# Patient Record
Sex: MALE | Race: WHITE | NOT HISPANIC OR LATINO | Employment: FULL TIME | URBAN - METROPOLITAN AREA
[De-identification: names, ages, dates, MRNs, and addresses within clinical notes are randomized per-mention and may not be internally consistent; named-entity substitution may affect disease eponyms.]

---

## 2022-05-13 ENCOUNTER — HOSPITAL ENCOUNTER (EMERGENCY)
Facility: HOSPITAL | Age: 32
Discharge: HOME | End: 2022-05-13
Admitting: PHYSICIAN ASSISTANT
Payer: COMMERCIAL

## 2022-05-13 VITALS
BODY MASS INDEX: 20.76 KG/M2 | HEIGHT: 70 IN | OXYGEN SATURATION: 98 % | RESPIRATION RATE: 18 BRPM | HEART RATE: 90 BPM | TEMPERATURE: 99 F | DIASTOLIC BLOOD PRESSURE: 78 MMHG | WEIGHT: 145 LBS | SYSTOLIC BLOOD PRESSURE: 141 MMHG

## 2022-05-13 DIAGNOSIS — S01.01XA LACERATION OF SCALP WITHOUT FOREIGN BODY, INITIAL ENCOUNTER: ICD-10-CM

## 2022-05-13 PROCEDURE — 250N000011 HC RX IP 250 OP 636: Performed by: STUDENT IN AN ORGANIZED HEALTH CARE EDUCATION/TRAINING PROGRAM

## 2022-05-13 PROCEDURE — 271N000002 HC RX 271: Performed by: STUDENT IN AN ORGANIZED HEALTH CARE EDUCATION/TRAINING PROGRAM

## 2022-05-13 PROCEDURE — 12001 RPR S/N/AX/GEN/TRNK 2.5CM/<: CPT

## 2022-05-13 PROCEDURE — 250N000009 HC RX 250: Performed by: STUDENT IN AN ORGANIZED HEALTH CARE EDUCATION/TRAINING PROGRAM

## 2022-05-13 PROCEDURE — 99283 EMERGENCY DEPT VISIT LOW MDM: CPT

## 2022-05-13 RX ORDER — METHYLCELLULOSE 4000CPS 30 %
POWDER (GRAM) MISCELLANEOUS
Status: COMPLETED | OUTPATIENT
Start: 2022-05-13 | End: 2022-05-13

## 2022-05-13 RX ADMIN — EPINEPHRINE BITARTRATE 3 ML: 1 POWDER at 22:05

## 2022-05-13 RX ADMIN — Medication 150 MG: at 22:05

## 2022-05-14 NOTE — ED PROVIDER NOTES
ED PROVIDER NOTE    EMERGENCY DEPARTMENT ENCOUNTER      NAME: Hsusein Malone  AGE: 31 year old male  YOB: 1990  MRN: [id number]  EVALUATION DATE & TIME: 5/13/2022 11:18 PM    PCP: No Ref-Primary, Physician    ED PROVIDER: Britany Grove PA-C      Chief Complaint   Patient presents with     Head Laceration         FINAL IMPRESSION:  1. Laceration of scalp without foreign body, initial encounter          MEDICAL DECISION MAKING:    Pertinent Labs & Imaging studies reviewed. (See chart for details)  31 year old male who is otherwise healthy presenting with a scalp laceration after a tree branch fell on his head.  There was no loss of consciousness.  Wife does state he was a little bit out of it while trying to get ready to come to the ER.  He thinks this might just be because he was working all day and fatigued.  He denies headache or other neurologic symptoms at this time.    He is well-appearing, no acute distress.  There is a laceration over the scalp.  No skull depression or tenderness.  Alert and oriented x4.  No focal neurologic deficits.  We did discuss risks and benefits of imaging at this time given the size of the branch that fell on his head.  At this time he does not want to pursue imaging and rather just wants to monitor his symptoms.  We discussed risks of concussion and more significant head injury.    Laceration was cleaned and repaired with staples.  Tetanus is up-to-date.  Wound instructions discussed and patient discharged in stable condition.    At the conclusion of the encounter I discussed the results of all of the tests and the disposition. The questions were answered. The patient or family acknowledged understanding and was agreeable with the care plan.         MEDICATIONS GIVEN IN THE EMERGENCY:  Medications   lidocaine/EPINEPHrine/tetracaine (LET) solution KIT 3 mL (3 mLs Topical Given 5/13/22 2205)   methylcellulose powder (150 mg Topical Given 5/13/22 2205)       NEW  "PRESCRIPTIONS STARTED AT TODAY'S ER VISIT  There are no discharge medications for this patient.         =================================================================    HPI    Patient information was obtained from: Patient    Hussein Malone is a 31 year old male who presents with a scalp laceration.  He was cutting down trees when a branch fell on his head   This evening.    He denies headache, nausea, vision changes or other symptoms at this time.  His wife does say he was a little bit out of it while they were trying to get ready to come to the emergency department.    Tetanus updated 2 years ago.      REVIEW OF SYSTEMS   Constitutional: Negative for fevers, chills.  Vision: Negative for vision changes  HENT:  Negative for facial pain/headache  Pulmonary: Negative for shortness of breath  Cardiac: Negative for chest pain  GI:Negative for nausea, vomiting, diarrhea, abdominal pain  Musculoskeletal: Negative for extremity pain  Skin:+ scalp laceration  Neuro: Negative for weakness, numbness    All other systems reviewed and are negative        PAST MEDICAL HISTORY:  No past medical history on file.    PAST SURGICAL HISTORY:  No past surgical history on file.        CURRENT MEDICATIONS:    No current facility-administered medications for this encounter.  No current outpatient medications on file.    ALLERGIES:  No Known Allergies    FAMILY HISTORY:  No family history on file.    SOCIAL HISTORY:   Smoking: Denies  . Has one child    VITALS:  Vitals:    05/13/22 2158   BP: (!) 141/78   Pulse: 90   Resp: 18   Temp: 99  F (37.2  C)   TempSrc: Temporal   SpO2: 98%   Weight: 65.8 kg (145 lb)   Height: 1.778 m (5' 10\")       PHYSICAL EXAM    General Appearance:  Alert, cooperative, no distress, appears stated age  HENT: Normocephalic.  2.5cm V shaped laceration on top of scalp. Mucous membranes moist   Eyes: Conjunctiva clear, Lids normal. No discharge.   Respiratory: No distress. Lungs clear to ausculation " bilaterally. No wheezes, rhonchi or stridor  Cardiovascular: Regular rate and rhythm, no murmur. Normal cap refill.   Musculoskeletal: Moving all extremities. No gross deformities  Integument: Warm, dry, no rashes or lesions  Neurologic: Alert and orientated x3. No focal deficits.  Psych: Normal mood and affect        LAB:  Labs Ordered and Resulted from Time of ED Arrival to Time of ED Departure - No data to display    RADIOLOGY:  No orders to display           PROCEDURES:   PROCEDURE: Laceration Repair   INDICATIONS: Laceration   PROCEDURE PROVIDER: Britany Grove PA-C   SITE: Scalp   TYPE/SIZE: simple, clean and no foreign body visualized  2.5 cm (total length)   FUNCTIONAL ASSESSMENT: Distal sensation and circulation intact   MEDICATION: LET   PREPARATION: scrubbing and irrigation with Sterile water   DEBRIDEMENT: no debridement   CLOSURE:  Superficial layer closed with 4 staples    Total number of sutures/staples placed: 4         Britany Grove PA-C   Emergency Medicine       Britany Grove PA-C  05/14/22 0055

## 2022-05-14 NOTE — ED TRIAGE NOTES
Patient reports that he was cutting down a tree at 1900 when a branch fell on his head. Laceration noted to the top of head, bleeding controlled. Last tetanus approximately two years ago. Denies LOC, no thinners.     Triage Assessment     Row Name 05/13/22 4652       Triage Assessment (Adult)    Airway WDL WDL       Respiratory WDL    Respiratory WDL WDL       Skin Circulation/Temperature WDL    Skin Circulation/Temperature WDL WDL       Cardiac WDL    Cardiac WDL WDL       Peripheral/Neurovascular WDL    Peripheral Neurovascular WDL WDL       Cognitive/Neuro/Behavioral WDL    Cognitive/Neuro/Behavioral WDL WDL

## 2022-05-14 NOTE — DISCHARGE INSTRUCTIONS
We have cleaned and repaired your laceration with 4 staples.  Avoid getting wet for the next 24 hours.  Monitor for any signs of infection.  If you notice any redness, swelling, drainage or increased pain, you should be reevaluated.    We discussed the possibility of more significant head injury.  At this time it is reasonable to just watch her symptoms however if you do have worsening headaches, vision changes, vomiting, confusion, you should return as these can be signs of more significant head injury like a head bleed.    Follow up with primary care in 1 week for staple removal

## 2022-05-23 ENCOUNTER — OFFICE VISIT (OUTPATIENT)
Dept: INTERNAL MEDICINE | Facility: CLINIC | Age: 32
End: 2022-05-23
Payer: COMMERCIAL

## 2022-05-23 VITALS
RESPIRATION RATE: 16 BRPM | HEART RATE: 67 BPM | DIASTOLIC BLOOD PRESSURE: 70 MMHG | TEMPERATURE: 97.3 F | SYSTOLIC BLOOD PRESSURE: 132 MMHG | OXYGEN SATURATION: 99 % | HEIGHT: 70 IN | BODY MASS INDEX: 21.19 KG/M2 | WEIGHT: 148 LBS

## 2022-05-23 DIAGNOSIS — S01.01XD LACERATION OF SCALP WITHOUT FOREIGN BODY, SUBSEQUENT ENCOUNTER: Primary | ICD-10-CM

## 2022-05-23 DIAGNOSIS — S09.90XD INJURY OF HEAD, SUBSEQUENT ENCOUNTER: ICD-10-CM

## 2022-05-23 PROCEDURE — 99203 OFFICE O/P NEW LOW 30 MIN: CPT | Performed by: INTERNAL MEDICINE

## 2022-05-23 NOTE — PROGRESS NOTES
"  Office Visit - Follow Up   Hussein Malone   31 year old male    Date of Visit: 5/23/2022    Chief Complaint   Patient presents with     staple removal from head     Recheck Medication        Assessment and Plan   1. Laceration of scalp without foreign body, subsequent encounter  4 staples are removed and wound is clean and intact except for a small portion that is not approximated but is not bleeding.  His tetanus is up-to-date.    2. Injury of head, subsequent encounter  We reviewed symptoms of concussion.  May be a little bit of confusion immediately after the injury but now doing fine, back to normal.    Return in about 4 weeks (around 6/20/2022) for Office visit, if symptoms worsen/fail to improve.     History of Present Illness   This 31 year old man whom I am meeting for the first time comes in after he had staples placed on the scalp laceration.  He was cutting a tree when a branch cut his head.  He does not think it hit him very hard but he was bleeding quite a bit.  A little bit of possible confusion immediately after but this is resolved and he has no symptoms of a concussion.  He had a tetanus vaccine 2 years ago.  He was seen in the ER on 5/13/2022 and had 4 staples placed    Review of Systems: A comprehensive review of systems was negative except as noted.     Medications, Allergies and Problem List   Reviewed, reconciled and updated  Post Discharge Medication Reconciliation Status:   Social History     Social History Narrative     Not on file        Physical Exam   General Appearance:   No acute distress    /70 (BP Location: Left arm, Patient Position: Sitting, Cuff Size: Adult Regular)   Pulse 67   Temp 97.3  F (36.3  C) (Axillary)   Resp 16   Ht 1.778 m (5' 10\")   Wt 67.1 kg (148 lb)   SpO2 99%   BMI 21.24 kg/m      He has 4 staples on a scalp laceration.  This is well approximated and healing.  There is just a little bit of flap that has not approximated but is not bleeding.  The 4 " staples are removed without difficulty and he had excellent hemostasis.     Additional Information   No current outpatient medications on file.     No Known Allergies  Social History     Tobacco Use     Smoking status: Never Smoker     Smokeless tobacco: Never Used   Substance Use Topics     Alcohol use: Yes     Comment: social drinker     Drug use: Never       Review and/or order of clinical lab tests:  Review and/or order of radiology tests:  Review and/or order of medicine tests:  Discussion of test results with performing physician:  Decision to obtain old records and/or obtain history from someone other than the patient:  Review and summarization of old records and/or obtaining history from someone other than the patient and.or discussion of case with another health care provider:  Independent visualization of image, tracing or specimen itself:    Time:      Ivan Lin MD  Answers for HPI/ROS submitted by the patient on 5/23/2022  What is the reason for your visit today? : Removing of staples from head injury  How many servings of fruits and vegetables do you eat daily?: 2-3  On average, how many sweetened beverages do you drink each day (Examples: soda, juice, sweet tea, etc.  Do NOT count diet or artificially sweetened beverages)?: 1  How many minutes a day do you exercise enough to make your heart beat faster?: 60 or more  How many days a week do you exercise enough to make your heart beat faster?: 6  How many days per week do you miss taking your medication?: 0

## 2023-05-21 ENCOUNTER — HEALTH MAINTENANCE LETTER (OUTPATIENT)
Age: 33
End: 2023-05-21

## 2024-07-28 ENCOUNTER — HEALTH MAINTENANCE LETTER (OUTPATIENT)
Age: 34
End: 2024-07-28